# Patient Record
Sex: MALE | URBAN - METROPOLITAN AREA
[De-identification: names, ages, dates, MRNs, and addresses within clinical notes are randomized per-mention and may not be internally consistent; named-entity substitution may affect disease eponyms.]

---

## 2023-07-05 ENCOUNTER — OFFICE VISIT (OUTPATIENT)
Facility: LOCATION | Age: 68
End: 2023-07-05
Payer: COMMERCIAL

## 2023-07-05 DIAGNOSIS — H40.1133 PRIMARY OPEN-ANGLE GLAUCOMA, `BILATERAL`, SEVERE STAGE: Primary | ICD-10-CM

## 2023-07-05 DIAGNOSIS — B02.33 ZOSTER KERATITIS: ICD-10-CM

## 2023-07-05 PROCEDURE — 92133 CPTRZD OPH DX IMG PST SGM ON: CPT | Performed by: OPHTHALMOLOGY

## 2023-07-05 PROCEDURE — 92012 INTRM OPH EXAM EST PATIENT: CPT | Performed by: OPHTHALMOLOGY

## 2023-07-05 ASSESSMENT — INTRAOCULAR PRESSURE
OS: 20
OD: 19

## 2023-07-05 NOTE — IMPRESSION/PLAN
Impression: Primary open-angle glaucoma, `BILATERAL`, severe stage. Plan: Primary Open Angle Glaucoma OU. This is a very advanced cupping with advanced HVF change , significant cupping and pallor. His IOP needs to be in the low teens. IOP is not well controlled,will attempt SLT although this might be challenging due to the Narrow Angle component. IOP and  Gonio were unchanged after dilation. Resume  Latanoprost OD QHS 9has not used for 2 days), continue  Timolol OU BID , Prednisolone OS QD , Dorzolamide OU BID and  Brimonidine OD BID . Advised not to drive.

## 2023-07-05 NOTE — IMPRESSION/PLAN
Impression: Zoster Keratitis.  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 Cont. Pred Forte 1% QD OS. Return immediately for any worsening symptoms or change in vision.  NPAT's Q2h.

## 2023-08-09 ENCOUNTER — OFFICE VISIT (OUTPATIENT)
Facility: LOCATION | Age: 68
End: 2023-08-09
Payer: MEDICARE

## 2023-08-09 DIAGNOSIS — B02.33 ZOSTER KERATITIS: ICD-10-CM

## 2023-08-09 DIAGNOSIS — H25.11 AGE-RELATED NUCLEAR CATARACT, RIGHT EYE: ICD-10-CM

## 2023-08-09 DIAGNOSIS — H40.1133 PRIMARY OPEN-ANGLE GLAUCOMA, `BILATERAL`, SEVERE STAGE: Primary | ICD-10-CM

## 2023-08-09 DIAGNOSIS — H34.8110 CENTRAL RETINAL VEIN OCCLUSION W/ MACULAR EDEMA, RIGHT EYE: ICD-10-CM

## 2023-08-09 PROCEDURE — 92012 INTRM OPH EXAM EST PATIENT: CPT | Performed by: OPHTHALMOLOGY

## 2023-08-09 PROCEDURE — 92083 EXTENDED VISUAL FIELD XM: CPT | Performed by: OPHTHALMOLOGY

## 2023-08-09 ASSESSMENT — INTRAOCULAR PRESSURE
OD: 16
OS: 16